# Patient Record
Sex: FEMALE | Race: WHITE | NOT HISPANIC OR LATINO | URBAN - METROPOLITAN AREA
[De-identification: names, ages, dates, MRNs, and addresses within clinical notes are randomized per-mention and may not be internally consistent; named-entity substitution may affect disease eponyms.]

---

## 2017-02-20 ENCOUNTER — IMPORTED ENCOUNTER (OUTPATIENT)
Dept: URBAN - METROPOLITAN AREA CLINIC 9 | Facility: CLINIC | Age: 77
End: 2017-02-20

## 2017-09-19 ENCOUNTER — IMPORTED ENCOUNTER (OUTPATIENT)
Dept: URBAN - METROPOLITAN AREA CLINIC 9 | Facility: CLINIC | Age: 77
End: 2017-09-19

## 2017-10-11 ENCOUNTER — IMPORTED ENCOUNTER (OUTPATIENT)
Dept: URBAN - METROPOLITAN AREA CLINIC 9 | Facility: CLINIC | Age: 77
End: 2017-10-11

## 2018-05-07 ENCOUNTER — IMPORTED ENCOUNTER (OUTPATIENT)
Dept: URBAN - METROPOLITAN AREA CLINIC 9 | Facility: CLINIC | Age: 78
End: 2018-05-07

## 2018-06-15 ENCOUNTER — IMPORTED ENCOUNTER (OUTPATIENT)
Dept: URBAN - METROPOLITAN AREA CLINIC 9 | Facility: CLINIC | Age: 78
End: 2018-06-15

## 2018-06-15 NOTE — PATIENT DISCUSSION
HEADACHES WITH NORMAL OPHTHALMOLOGICAL EXAM, OU: RECOMMEND NEW GLASSES PRESCRIPTION TO CORRECT MYOPIA AND IF HEADACHES PERSIST AFTER GETTING GLASSES, RECOMMEND PATIENT FOLLOW UP WITH PRIMARY CARE PHYSICIAN.

## 2018-06-15 NOTE — PATIENT DISCUSSION
Headaches; The trigger initially causes headaches that then become more frequent, eventually occurring almost every day, and difficult to treat. Strategies for treatment and preventing progression include appropriate pain control with medications, avoiding overuse of ibuprofen and acetaminophen, reducing the frequency of headaches with behavioral techniques and preventive medications, and encouraging the adoption of a healthy lifestyle.

## 2018-06-15 NOTE — PATIENT DISCUSSION
[facility]/DRY EYE SYNDROME, OD &gt; OS: PRESCRIBED ARTIFICIAL TEARS BID - QID, OU. RETURN FOR FOLLOW-UP AS SCHEDULED OR SOONER IF SYMPTOMS WORSEN.

## 2018-06-21 ENCOUNTER — IMPORTED ENCOUNTER (OUTPATIENT)
Dept: URBAN - METROPOLITAN AREA CLINIC 9 | Facility: CLINIC | Age: 78
End: 2018-06-21

## 2018-06-27 ENCOUNTER — IMPORTED ENCOUNTER (OUTPATIENT)
Dept: URBAN - METROPOLITAN AREA CLINIC 9 | Facility: CLINIC | Age: 78
End: 2018-06-27

## 2018-07-13 ENCOUNTER — IMPORTED ENCOUNTER (OUTPATIENT)
Dept: URBAN - METROPOLITAN AREA CLINIC 9 | Facility: CLINIC | Age: 78
End: 2018-07-13

## 2018-07-18 ENCOUNTER — IMPORTED ENCOUNTER (OUTPATIENT)
Dept: URBAN - METROPOLITAN AREA CLINIC 9 | Facility: CLINIC | Age: 78
End: 2018-07-18

## 2018-07-25 ENCOUNTER — IMPORTED ENCOUNTER (OUTPATIENT)
Dept: URBAN - METROPOLITAN AREA CLINIC 9 | Facility: CLINIC | Age: 78
End: 2018-07-25

## 2018-07-30 ENCOUNTER — IMPORTED ENCOUNTER (OUTPATIENT)
Dept: URBAN - METROPOLITAN AREA CLINIC 9 | Facility: CLINIC | Age: 78
End: 2018-07-30

## 2019-02-13 ENCOUNTER — IMPORTED ENCOUNTER (OUTPATIENT)
Dept: URBAN - METROPOLITAN AREA CLINIC 9 | Facility: CLINIC | Age: 79
End: 2019-02-13

## 2019-02-21 ENCOUNTER — IMPORTED ENCOUNTER (OUTPATIENT)
Dept: URBAN - METROPOLITAN AREA CLINIC 9 | Facility: CLINIC | Age: 79
End: 2019-02-21

## 2019-08-22 ENCOUNTER — IMPORTED ENCOUNTER (OUTPATIENT)
Dept: URBAN - METROPOLITAN AREA CLINIC 9 | Facility: CLINIC | Age: 79
End: 2019-08-22

## 2020-03-09 ENCOUNTER — IMPORTED ENCOUNTER (OUTPATIENT)
Dept: URBAN - METROPOLITAN AREA CLINIC 9 | Facility: CLINIC | Age: 80
End: 2020-03-09

## 2020-03-09 PROBLEM — H35.3131: Noted: 2020-03-09

## 2020-03-09 PROBLEM — H35.372: Noted: 2020-03-09

## 2020-03-09 PROBLEM — H35.342: Noted: 2020-03-09

## 2021-10-16 ASSESSMENT — TONOMETRY
OS_IOP_MMHG: 14
OS_IOP_MMHG: 12
OD_IOP_MMHG: 18
OD_IOP_MMHG: 13
OS_IOP_MMHG: 13
OS_IOP_MMHG: 13
OS_IOP_MMHG: 24
OD_IOP_MMHG: 11
OD_IOP_MMHG: 11
OD_IOP_MMHG: 42
OS_IOP_MMHG: 12
OS_IOP_MMHG: 22
OD_IOP_MMHG: 11
OS_IOP_MMHG: 14
OD_IOP_MMHG: 18
OD_IOP_MMHG: 18
OS_IOP_MMHG: 30
OS_IOP_MMHG: 11
OS_IOP_MMHG: 19
OD_IOP_MMHG: 34
OS_IOP_MMHG: 13
OS_IOP_MMHG: 17
OD_IOP_MMHG: 12
OS_IOP_MMHG: 16
OS_IOP_MMHG: 18
OD_IOP_MMHG: 14
OD_IOP_MMHG: 14
OS_IOP_MMHG: 34
OD_IOP_MMHG: 15

## 2021-10-16 ASSESSMENT — VISUAL ACUITY
OS_CC: 20/30 SN
OD_CC: 20/30 SN
OD_CC: 20/30 SN
OS_CC: 20/50 + SN
OD_CC: 20/30 -2 SN
OD_CC: 20/50 + SN
OD_SC: 20/40 -2 SN
OD_CC: 20/30 -2 SN
OD_CC: 20/30 - SN
OS_PH: 20/40 SN
OD_PH: 20/30 SN
OD_CC: 20/25 -2 SN
OD_PH: 20/30 SN
OD_SC: 20/60 SN
OS_SC: 20/50 + SN
OS_SC: 20/40 - SN
OS_SC: 20/40 + SN
OS_CC: 20/50 SN
OD_CC: 20/30 SN
OS_SC: 20/50 SN
OS_CC: 20/40 + SN
OS_CC: 20/30 -2 SN
OD_SC: 20/100 SN
OD_CC: 20/25 -2 SN
OS_CC: 20/30 -2 SN
OD_SC: 20/30 +2 SN
OS_PH: 20/25 - SN
OS_SC: 20/50 SN
OD_CC: 20/30 - SN
OS_CC: 20/70 - SN
OS_CC: 20/50 SN
OS_CC: 20/60 SN
OD_CC: 20/30 + SN
OD_CC: 20/25 SN
OS_SC: 20/50 - SN
OS_CC: 20/40 SN

## 2022-05-03 ENCOUNTER — ESTABLISHED PATIENT (OUTPATIENT)
Dept: URBAN - METROPOLITAN AREA CLINIC 17 | Facility: CLINIC | Age: 82
End: 2022-05-03

## 2022-05-03 DIAGNOSIS — H26.493: ICD-10-CM

## 2022-05-03 DIAGNOSIS — H35.372: ICD-10-CM

## 2022-05-03 DIAGNOSIS — H35.342: ICD-10-CM

## 2022-05-03 DIAGNOSIS — H35.3131: ICD-10-CM

## 2022-05-03 PROCEDURE — 92015 DETERMINE REFRACTIVE STATE: CPT

## 2022-05-03 PROCEDURE — 92134 CPTRZ OPH DX IMG PST SGM RTA: CPT

## 2022-05-03 PROCEDURE — 99214 OFFICE O/P EST MOD 30 MIN: CPT

## 2022-05-03 ASSESSMENT — VISUAL ACUITY
OS_CC: 20/70
OD_CC: 20/30

## 2022-05-03 ASSESSMENT — TONOMETRY
OS_IOP_MMHG: 18
OD_IOP_MMHG: 19

## 2022-06-07 ENCOUNTER — POST-OP (OUTPATIENT)
Dept: URBAN - METROPOLITAN AREA CLINIC 17 | Facility: CLINIC | Age: 82
End: 2022-06-07

## 2022-06-07 DIAGNOSIS — Z98.890: ICD-10-CM

## 2022-06-07 PROCEDURE — 99024 POSTOP FOLLOW-UP VISIT: CPT

## 2022-06-07 ASSESSMENT — TONOMETRY: OS_IOP_MMHG: 19

## 2022-06-07 ASSESSMENT — VISUAL ACUITY
OU_CC: 20/30
OD_CC: 20/30
OS_CC: 20/50

## 2022-06-18 RX ORDER — AMLODIPINE BESYLATE 5 MG/1
TABLET ORAL
COMMUNITY

## 2022-06-18 RX ORDER — ESCITALOPRAM OXALATE 10 MG/1
TABLET ORAL
COMMUNITY

## 2022-06-18 RX ORDER — TRIAMTERENE AND HYDROCHLOROTHIAZIDE 75; 50 MG/1; MG/1
TABLET ORAL
COMMUNITY

## 2022-06-18 RX ORDER — CELECOXIB 200 MG/1
1 CAPSULE ORAL
COMMUNITY

## 2022-06-18 RX ORDER — ATORVASTATIN CALCIUM 20 MG/1
TABLET, FILM COATED ORAL
COMMUNITY

## 2022-06-18 RX ORDER — ESOMEPRAZOLE MAGNESIUM 40 MG/1
CAPSULE, DELAYED RELEASE ORAL
COMMUNITY

## 2022-06-18 RX ORDER — ATENOLOL 25 MG/1
TABLET ORAL
COMMUNITY

## 2022-06-18 RX ORDER — SOLIFENACIN SUCCINATE 5 MG/1
TABLET, FILM COATED ORAL
COMMUNITY

## 2023-01-23 ENCOUNTER — ESTABLISHED PATIENT (OUTPATIENT)
Dept: URBAN - METROPOLITAN AREA CLINIC 17 | Facility: CLINIC | Age: 83
End: 2023-01-23

## 2023-01-23 DIAGNOSIS — H35.342: ICD-10-CM

## 2023-01-23 DIAGNOSIS — H35.372: ICD-10-CM

## 2023-01-23 DIAGNOSIS — H26.491: ICD-10-CM

## 2023-01-23 DIAGNOSIS — H35.3131: ICD-10-CM

## 2023-01-23 PROCEDURE — 92134 CPTRZ OPH DX IMG PST SGM RTA: CPT

## 2023-01-23 PROCEDURE — 92014 COMPRE OPH EXAM EST PT 1/>: CPT

## 2023-01-23 ASSESSMENT — VISUAL ACUITY
OD_GLARE: 20/40
OD_SC: 20/30
OS_SC: 20/40-2
OS_GLARE: 20/50

## 2023-01-23 ASSESSMENT — TONOMETRY
OS_IOP_MMHG: 18
OD_IOP_MMHG: 15

## 2023-08-29 ENCOUNTER — ESTABLISHED PATIENT (OUTPATIENT)
Dept: URBAN - METROPOLITAN AREA CLINIC 17 | Facility: CLINIC | Age: 83
End: 2023-08-29

## 2023-08-29 DIAGNOSIS — H35.342: ICD-10-CM

## 2023-08-29 DIAGNOSIS — H26.491: ICD-10-CM

## 2023-08-29 DIAGNOSIS — H35.372: ICD-10-CM

## 2023-08-29 DIAGNOSIS — H35.3131: ICD-10-CM

## 2023-08-29 PROCEDURE — 92134 CPTRZ OPH DX IMG PST SGM RTA: CPT

## 2023-08-29 PROCEDURE — 99214 OFFICE O/P EST MOD 30 MIN: CPT

## 2023-08-29 PROCEDURE — 92015 DETERMINE REFRACTIVE STATE: CPT

## 2023-08-29 ASSESSMENT — VISUAL ACUITY
OS_CC: 20/40
OD_CC: 20/50

## 2023-08-29 ASSESSMENT — TONOMETRY
OS_IOP_MMHG: 16
OD_IOP_MMHG: 16